# Patient Record
Sex: FEMALE | Race: WHITE | NOT HISPANIC OR LATINO | Employment: FULL TIME | URBAN - METROPOLITAN AREA
[De-identification: names, ages, dates, MRNs, and addresses within clinical notes are randomized per-mention and may not be internally consistent; named-entity substitution may affect disease eponyms.]

---

## 2017-11-23 ENCOUNTER — HOSPITAL ENCOUNTER (EMERGENCY)
Facility: HOSPITAL | Age: 27
Discharge: HOME | End: 2017-11-24
Attending: EMERGENCY MEDICINE

## 2017-11-23 LAB
B-HCG UR QL: NEGATIVE
CTP QC/QA: YES

## 2017-11-23 PROCEDURE — 99283 EMERGENCY DEPT VISIT LOW MDM: CPT | Mod: 25

## 2017-11-23 PROCEDURE — 81025 URINE PREGNANCY TEST: CPT | Performed by: EMERGENCY MEDICINE

## 2017-11-23 PROCEDURE — 25000003 PHARM REV CODE 250: Performed by: EMERGENCY MEDICINE

## 2017-11-23 RX ORDER — DICYCLOMINE HYDROCHLORIDE 10 MG/1
10 CAPSULE ORAL
Status: COMPLETED | OUTPATIENT
Start: 2017-11-23 | End: 2017-11-23

## 2017-11-23 RX ORDER — PROMETHAZINE HYDROCHLORIDE 25 MG/1
TABLET ORAL
Status: DISCONTINUED
Start: 2017-11-23 | End: 2017-11-24 | Stop reason: HOSPADM

## 2017-11-23 RX ORDER — DICYCLOMINE HYDROCHLORIDE 10 MG/1
CAPSULE ORAL
Status: DISCONTINUED
Start: 2017-11-23 | End: 2017-11-24 | Stop reason: HOSPADM

## 2017-11-23 RX ORDER — PROMETHAZINE HYDROCHLORIDE 25 MG/1
50 TABLET ORAL
Status: COMPLETED | OUTPATIENT
Start: 2017-11-23 | End: 2017-11-23

## 2017-11-23 RX ADMIN — PROMETHAZINE HYDROCHLORIDE 50 MG: 25 TABLET ORAL at 10:11

## 2017-11-23 RX ADMIN — DICYCLOMINE HYDROCHLORIDE 10 MG: 10 CAPSULE ORAL at 10:11

## 2017-11-24 VITALS
RESPIRATION RATE: 16 BRPM | HEIGHT: 67 IN | BODY MASS INDEX: 22.44 KG/M2 | TEMPERATURE: 98 F | OXYGEN SATURATION: 99 % | HEART RATE: 94 BPM | DIASTOLIC BLOOD PRESSURE: 58 MMHG | SYSTOLIC BLOOD PRESSURE: 114 MMHG | WEIGHT: 143 LBS

## 2017-11-24 RX ORDER — PROMETHAZINE HYDROCHLORIDE 25 MG/1
25 TABLET ORAL EVERY 6 HOURS PRN
Qty: 15 TABLET | Refills: 0 | Status: SHIPPED | OUTPATIENT
Start: 2017-11-24 | End: 2020-07-28 | Stop reason: SDUPTHER

## 2017-11-24 NOTE — ED PROVIDER NOTES
Encounter Date: 11/23/2017    SCRIBE #1 NOTE: I, Kristina Frank , am scribing for, and in the presence of,  Dr. Stephens . I have scribed the entire note.       History     Chief Complaint   Patient presents with    Emesis     multiple episodes of vomiting in past hour; no abdominal pain     11/23/2017  10:39 PM     Chief Complaint: Emesis       The patient is a 27 y.o. female who is presenting with the acute onset of numerous episodes of non bloody emesis that began earlier this evening approximately x 1 hour s/p eating an undercooked ham. The pt denies any exacerbating or mitigating factors. Associated sx includes nausea and epigastric abdominal tenderness. No other comments or complaints. The pt endorses x 2 known sick contacts with similar onset of sx. No pertinent PMHx or past surgical hx.               The history is provided by the patient and medical records.     Review of patient's allergies indicates:  No Known Allergies  History reviewed. No pertinent past medical history.  Past Surgical History:   Procedure Laterality Date    adnoids      TYMPANOSTOMY TUBE PLACEMENT       History reviewed. No pertinent family history.  Social History   Substance Use Topics    Smoking status: Current Every Day Smoker     Packs/day: 1.00     Years: 10.00     Types: Cigarettes    Smokeless tobacco: Never Used    Alcohol use No     Review of Systems   Constitutional: Negative for fever.   HENT: Negative for congestion.    Eyes: Negative for visual disturbance.   Respiratory: Negative for wheezing.    Cardiovascular: Negative for chest pain.   Gastrointestinal: Positive for abdominal pain, nausea and vomiting.   Genitourinary: Negative for dysuria.   Musculoskeletal: Negative for joint swelling.   Skin: Negative for rash.   Neurological: Negative for syncope.   Hematological: Does not bruise/bleed easily.   Psychiatric/Behavioral: Negative for confusion.       Physical Exam     Initial Vitals [11/23/17 2220]   BP Pulse  Resp Temp SpO2   132/60 102 16 97.6 °F (36.4 °C) 100 %      MAP       84         Physical Exam    Nursing note and vitals reviewed.  Constitutional: She appears well-developed and well-nourished. She is not diaphoretic. No distress.   HENT:   Head: Normocephalic and atraumatic.   Mouth/Throat: Oropharynx is clear and moist.   Eyes: Conjunctivae and EOM are normal. Pupils are equal, round, and reactive to light.   Neck: Normal range of motion. Neck supple. No thyroid mass present.   Cardiovascular: Normal rate and regular rhythm.   Abdominal: Soft. Normal appearance and bowel sounds are normal. There is no tenderness.   Neurological: She is alert and oriented to person, place, and time. She has normal strength. No cranial nerve deficit or sensory deficit.   Skin: Skin is warm and dry. Capillary refill takes less than 2 seconds. No rash noted. No erythema.   Psychiatric: She has a normal mood and affect. Her speech is normal. Cognition and memory are normal.         ED Course   Procedures  Labs Reviewed   POCT URINE PREGNANCY             Medical Decision Making:   Initial Assessment:   Patient was interviewed and examined in the presence of her significant other.  At this time she is noted to be in no acute distress and with VSS.  She has a nonfocal and nonsurgical abdominal examination.  She has multiple sick contacts concerning for progressing food poisoning associated with a undercooked ham tonight.  Currently she is denying wanting to receive labs or imaging and I do not think this is warranted.  She denies wanting to receive IV hydration but is requesting by mouth medication for control of bowel spasming and emesis.  She was provided single dosing oral promethazine and Bentyl.  ED Management:  On reassessment, the patient was seen sleeping and only had 1 episode of brief emesis the emergency department.  Currently I do think she stable for discharge and she is in agreement.  She was educated about supportive care  at home and will be asked to stay hydrated.  She was discharged with a prescription for oral antiemetic.  She is asked to follow-up with her primary care doctor as soon as possible regarding improvement or to return to the ER for any new, concerning, or worsening symptoms.   agreeable with this plan for follow-up and she was discharged in stable condition.            Scribe Attestation:   Scribe #1: I performed the above scribed service and the documentation accurately describes the services I performed. I attest to the accuracy of the note.    I, Dr. Clinton Stephens, personally performed the services described in this documentation. All medical record entries made by the scribe were at my direction and in my presence.  I have reviewed the chart and agree that the record reflects my personal performance and is accurate and complete. Clinton Stephens MD.  10:58 PM 11/23/2017          ED Course      Clinical Impression:   The encounter diagnosis was Food poisoning, accidental or unintentional, initial encounter.    Disposition:   Disposition: Discharged  Condition: Stable                        Clinton Stephens MD  11/24/17 0303

## 2017-11-24 NOTE — ED NOTES
Patient identifiers for Liliana Tam checked and correct.  LOC:  Patient is awake, alert, and aware of environment with an appropriate affect. Patient is oriented x 3 and speaking appropriately.  APPEARANCE:  Patient resting comfortably and in no acute distress. Patient is clean and well groomed, patient's clothing is properly fastened.  SKIN:  The skin is warm and dry. Patient has normal skin turgor and moist mucus membranes. Skin is intact; no bruising or breakdown noted.  MUSCULOSKELETAL:  Patient is moving all extremities well, no obvious deformities noted. Pulses intact.   RESPIRATORY:  Airway is open and patent. Respirations are spontaneous and non-labored with normal effort and rate.  CARDIAC:  Patient has a normal rate and rhythm. No peripheral edema noted. Capillary refill < 3 seconds.  ABDOMEN:  No distention noted.  Soft and non-tender upon palpation.Pt reports vomiting x 20 after eating ham today.   NEUROLOGICAL:  PERRL. Facial expression is symmetrical. Hand grasps are equal bilaterally. Normal sensation in all extremities when touched with finger.  Allergies reported:  Review of patient's allergies indicates:  No Known Allergies  OTHER NOTES:

## 2020-07-28 ENCOUNTER — HOSPITAL ENCOUNTER (EMERGENCY)
Facility: HOSPITAL | Age: 30
Discharge: HOME | End: 2020-07-28
Attending: EMERGENCY MEDICINE
Payer: COMMERCIAL

## 2020-07-28 VITALS
HEART RATE: 77 BPM | DIASTOLIC BLOOD PRESSURE: 67 MMHG | OXYGEN SATURATION: 100 % | RESPIRATION RATE: 20 BRPM | SYSTOLIC BLOOD PRESSURE: 109 MMHG | TEMPERATURE: 98 F

## 2020-07-28 DIAGNOSIS — R19.7 ACUTE DIARRHEA: Primary | ICD-10-CM

## 2020-07-28 DIAGNOSIS — K52.9 ACUTE COLITIS: ICD-10-CM

## 2020-07-28 LAB
ALBUMIN SERPL BCP-MCNC: 4.2 G/DL (ref 3.5–5.2)
ALP SERPL-CCNC: 71 U/L (ref 55–135)
ALT SERPL W/O P-5'-P-CCNC: 13 U/L (ref 10–44)
ANION GAP SERPL CALC-SCNC: 11 MMOL/L (ref 8–16)
AST SERPL-CCNC: 16 U/L (ref 10–40)
B-HCG UR QL: NEGATIVE
BACTERIA #/AREA URNS HPF: ABNORMAL /HPF
BASOPHILS # BLD AUTO: 0.01 K/UL (ref 0–0.2)
BASOPHILS NFR BLD: 0.2 % (ref 0–1.9)
BILIRUB SERPL-MCNC: 0.3 MG/DL (ref 0.1–1)
BILIRUB UR QL STRIP: NEGATIVE
BUN SERPL-MCNC: 6 MG/DL (ref 6–20)
CALCIUM SERPL-MCNC: 9.3 MG/DL (ref 8.7–10.5)
CHLORIDE SERPL-SCNC: 108 MMOL/L (ref 95–110)
CLARITY UR: CLEAR
CO2 SERPL-SCNC: 23 MMOL/L (ref 23–29)
COLOR UR: YELLOW
CREAT SERPL-MCNC: 0.7 MG/DL (ref 0.5–1.4)
CTP QC/QA: YES
DIFFERENTIAL METHOD: ABNORMAL
EOSINOPHIL # BLD AUTO: 0 K/UL (ref 0–0.5)
EOSINOPHIL NFR BLD: 0.4 % (ref 0–8)
ERYTHROCYTE [DISTWIDTH] IN BLOOD BY AUTOMATED COUNT: 12.1 % (ref 11.5–14.5)
EST. GFR  (AFRICAN AMERICAN): >60 ML/MIN/1.73 M^2
EST. GFR  (NON AFRICAN AMERICAN): >60 ML/MIN/1.73 M^2
GLUCOSE SERPL-MCNC: 87 MG/DL (ref 70–110)
GLUCOSE UR QL STRIP: NEGATIVE
HCT VFR BLD AUTO: 40.8 % (ref 37–48.5)
HGB BLD-MCNC: 13.1 G/DL (ref 12–16)
HGB UR QL STRIP: NEGATIVE
IMM GRANULOCYTES # BLD AUTO: 0.01 K/UL (ref 0–0.04)
IMM GRANULOCYTES NFR BLD AUTO: 0.2 % (ref 0–0.5)
KETONES UR QL STRIP: NEGATIVE
LEUKOCYTE ESTERASE UR QL STRIP: ABNORMAL
LIPASE SERPL-CCNC: 13 U/L (ref 4–60)
LYMPHOCYTES # BLD AUTO: 1 K/UL (ref 1–4.8)
LYMPHOCYTES NFR BLD: 17.6 % (ref 18–48)
MCH RBC QN AUTO: 31.1 PG (ref 27–31)
MCHC RBC AUTO-ENTMCNC: 32.1 G/DL (ref 32–36)
MCV RBC AUTO: 97 FL (ref 82–98)
MICROSCOPIC COMMENT: ABNORMAL
MONOCYTES # BLD AUTO: 0.3 K/UL (ref 0.3–1)
MONOCYTES NFR BLD: 5.5 % (ref 4–15)
NEUTROPHILS # BLD AUTO: 4.1 K/UL (ref 1.8–7.7)
NEUTROPHILS NFR BLD: 76.1 % (ref 38–73)
NITRITE UR QL STRIP: NEGATIVE
NRBC BLD-RTO: 0 /100 WBC
PH UR STRIP: 7 [PH] (ref 5–8)
PLATELET # BLD AUTO: 178 K/UL (ref 150–350)
PMV BLD AUTO: 9.8 FL (ref 9.2–12.9)
POTASSIUM SERPL-SCNC: 4.1 MMOL/L (ref 3.5–5.1)
PROT SERPL-MCNC: 7.3 G/DL (ref 6–8.4)
PROT UR QL STRIP: NEGATIVE
RBC # BLD AUTO: 4.21 M/UL (ref 4–5.4)
RBC #/AREA URNS HPF: 2 /HPF (ref 0–4)
SODIUM SERPL-SCNC: 142 MMOL/L (ref 136–145)
SP GR UR STRIP: 1.01 (ref 1–1.03)
SQUAMOUS #/AREA URNS HPF: 7 /HPF
URN SPEC COLLECT METH UR: ABNORMAL
UROBILINOGEN UR STRIP-ACNC: NEGATIVE EU/DL
WBC # BLD AUTO: 5.41 K/UL (ref 3.9–12.7)
WBC #/AREA URNS HPF: 13 /HPF (ref 0–5)

## 2020-07-28 PROCEDURE — 87086 URINE CULTURE/COLONY COUNT: CPT

## 2020-07-28 PROCEDURE — 36415 COLL VENOUS BLD VENIPUNCTURE: CPT

## 2020-07-28 PROCEDURE — 25500020 PHARM REV CODE 255

## 2020-07-28 PROCEDURE — 80053 COMPREHEN METABOLIC PANEL: CPT

## 2020-07-28 PROCEDURE — 85025 COMPLETE CBC W/AUTO DIFF WBC: CPT

## 2020-07-28 PROCEDURE — 25000003 PHARM REV CODE 250: Performed by: EMERGENCY MEDICINE

## 2020-07-28 PROCEDURE — 99285 EMERGENCY DEPT VISIT HI MDM: CPT | Mod: 25

## 2020-07-28 PROCEDURE — 96360 HYDRATION IV INFUSION INIT: CPT | Mod: 59

## 2020-07-28 PROCEDURE — 83690 ASSAY OF LIPASE: CPT

## 2020-07-28 PROCEDURE — 81025 URINE PREGNANCY TEST: CPT | Performed by: EMERGENCY MEDICINE

## 2020-07-28 PROCEDURE — 81000 URINALYSIS NONAUTO W/SCOPE: CPT

## 2020-07-28 PROCEDURE — U0003 INFECTIOUS AGENT DETECTION BY NUCLEIC ACID (DNA OR RNA); SEVERE ACUTE RESPIRATORY SYNDROME CORONAVIRUS 2 (SARS-COV-2) (CORONAVIRUS DISEASE [COVID-19]), AMPLIFIED PROBE TECHNIQUE, MAKING USE OF HIGH THROUGHPUT TECHNOLOGIES AS DESCRIBED BY CMS-2020-01-R: HCPCS

## 2020-07-28 RX ORDER — ONDANSETRON 4 MG/1
4 TABLET, FILM COATED ORAL EVERY 6 HOURS PRN
Qty: 12 TABLET | Refills: 0 | Status: SHIPPED | OUTPATIENT
Start: 2020-07-28 | End: 2021-10-14

## 2020-07-28 RX ORDER — PROMETHAZINE HYDROCHLORIDE 25 MG/1
25 TABLET ORAL EVERY 6 HOURS PRN
Qty: 15 TABLET | Refills: 0 | Status: SHIPPED | OUTPATIENT
Start: 2020-07-28 | End: 2021-10-14

## 2020-07-28 RX ORDER — CIPROFLOXACIN 500 MG/1
500 TABLET ORAL 2 TIMES DAILY
Qty: 20 TABLET | Refills: 0 | Status: SHIPPED | OUTPATIENT
Start: 2020-07-28 | End: 2020-08-04

## 2020-07-28 RX ORDER — METRONIDAZOLE 500 MG/1
500 TABLET ORAL 3 TIMES DAILY
Qty: 21 TABLET | Refills: 0 | Status: SHIPPED | OUTPATIENT
Start: 2020-07-28 | End: 2020-08-04

## 2020-07-28 RX ADMIN — IOHEXOL 75 ML: 350 INJECTION, SOLUTION INTRAVENOUS at 12:07

## 2020-07-28 RX ADMIN — SODIUM CHLORIDE 1000 ML: 0.9 INJECTION, SOLUTION INTRAVENOUS at 11:07

## 2020-07-28 NOTE — ED NOTES
Constitutional: She appears well-nourished.   HENT:   Head: Normocephalic and atraumatic.   Eyes: Conjunctivae and EOM are normal.   Neck: Normal range of motion. Neck supple. No thyroid mass present.   Cardiovascular: Normal rate, regular rhythm and normal heart sounds. Exam reveals no gallop and no friction rub.    No murmur heard.  Pulmonary/Chest: Breath sounds normal. She has no wheezes. She has no rhonchi. She has no rales.   Abdominal: Soft. Normal appearance and bowel sounds are normal. There is abdominal tenderness in the left lower quadrant.   Hypermobile bowel sounds.  Mild tenderness to palpation to the LLQ.   Neurological: She is alert and oriented to person, place, and time. She has normal strength. No cranial nerve deficit or sensory deficit.   Skin: Skin is warm and dry. No rash noted. No erythema.

## 2020-07-28 NOTE — ED PROVIDER NOTES
"Encounter Date: 7/28/2020    SCRIBE #1 NOTE: I, Randa Loza, am scribing for, and in the presence of, Dr. Stephens.       History     Chief Complaint   Patient presents with    Abdominal Pain     beginning yesterday    Diarrhea       Time seen by provider: 10:06 AM on 07/28/2020    Liliana Oshea is a 29 y.o. female with no PMHx who presents to the ED via EMS with an onset of intermittent abdominal pain.  Patient reports pain began 1 day ago and is felt in her upper left, lower left and lower right abdominal region.  She notes going to the bathroom and producing "mucous-like" stools.  Patient reports having 10 bowel movements in 1 day.  She states that she has a family Hx of gastric issues.  She confirms feeling light-headed, dizzy, having bloody stools, low grade fever, diarrhea, nausea, a Hx of asthma and bronchitis and recently swimming in a "dirty" lake.   She also confirms feeling swollen and bloated in her abdominal area.  The patient denies vomiting, eating anything red, changes to her appetitie or any other symptoms at this time.  She also denies a Hx of IBS, Chrohn's disease, hemorrhoids, ulcers nor colitis.  No pertinent PSHx.  LMP was 2 weeks ago.       The history is provided by the patient.     Review of patient's allergies indicates:  No Known Allergies  No past medical history on file.  Past Surgical History:   Procedure Laterality Date    adnoids      TYMPANOSTOMY TUBE PLACEMENT       No family history on file.  Social History     Tobacco Use    Smoking status: Current Every Day Smoker     Packs/day: 1.00     Years: 10.00     Pack years: 10.00     Types: Cigarettes    Smokeless tobacco: Never Used   Substance Use Topics    Alcohol use: No    Drug use: No     Review of Systems   Constitutional: Positive for fever. Negative for appetite change.   HENT: Negative for sore throat.    Respiratory: Negative for shortness of breath.    Cardiovascular: Negative for chest pain. "   Gastrointestinal: Positive for abdominal pain, blood in stool, diarrhea and nausea. Negative for vomiting.   Genitourinary: Negative for dysuria.   Musculoskeletal: Negative for back pain.   Skin: Negative for rash.   Neurological: Positive for dizziness and light-headedness. Negative for weakness.   Hematological: Does not bruise/bleed easily.       Physical Exam     Initial Vitals [07/28/20 0959]   BP Pulse Resp Temp SpO2   109/67 77 20 98.2 °F (36.8 °C) 100 %      MAP       --         Physical Exam    Nursing note and vitals reviewed.  Constitutional: She appears well-nourished.   HENT:   Head: Normocephalic and atraumatic.   Eyes: Conjunctivae and EOM are normal.   Neck: Normal range of motion. Neck supple. No thyroid mass present.   Cardiovascular: Normal rate, regular rhythm and normal heart sounds. Exam reveals no gallop and no friction rub.    No murmur heard.  Pulmonary/Chest: Breath sounds normal. She has no wheezes. She has no rhonchi. She has no rales.   Abdominal: Soft. Normal appearance and bowel sounds are normal. There is abdominal tenderness in the left lower quadrant.   Hypermobile bowel sounds.  Mild tenderness to palpation to the LLQ.   Neurological: She is alert and oriented to person, place, and time. She has normal strength. No cranial nerve deficit or sensory deficit.   Skin: Skin is warm and dry. No rash noted. No erythema.   Psychiatric: She has a normal mood and affect. Her speech is normal. Cognition and memory are normal.         ED Course   Procedures  Labs Reviewed   CBC W/ AUTO DIFFERENTIAL - Abnormal; Notable for the following components:       Result Value    Mean Corpuscular Hemoglobin 31.1 (*)     Gran% 76.1 (*)     Lymph% 17.6 (*)     All other components within normal limits   URINALYSIS, REFLEX TO URINE CULTURE - Abnormal; Notable for the following components:    Leukocytes, UA 1+ (*)     All other components within normal limits    Narrative:     Specimen Source->Urine    URINALYSIS MICROSCOPIC - Abnormal; Notable for the following components:    WBC, UA 13 (*)     Bacteria Few (*)     All other components within normal limits    Narrative:     Specimen Source->Urine   CULTURE, URINE   COMPREHENSIVE METABOLIC PANEL   LIPASE   SARS-COV-2 (COVID-19) QUALITATIVE PCR   POCT URINE PREGNANCY          Imaging Results          CT Abdomen Pelvis With Contrast (Final result)  Result time 07/28/20 12:38:14    Final result by Chelsea Mahmood MD (07/28/20 12:38:14)                 Impression:      Distal colonic wall thickening/inflammation is questioned but difficult to confirm without the use oral contrast. This could be due to diverticulitis, infectious colitis, inflammatory bowel disease.    Physiologic follicular cyst right ovary.  Nonobstructing right lower pole renal calculus.  Probable passive congestion of the liver from aggressive hydration, see comments above.      Electronically signed by: Chelsea Mahmood  Date:    07/28/2020  Time:    12:38             Narrative:    EXAMINATION:  CT ABDOMEN PELVIS WITH CONTRAST    CLINICAL HISTORY:  LLQ abdominal pain, diverticulitis suspected;    TECHNIQUE:  Low dose axial images, sagittal and coronal reformations were obtained from the lung bases to the pubic symphysis following the IV administration of 75 mL of Omnipaque 350 .  No oral contrast was administered    COMPARISON:  None    FINDINGS:  Visualized portions of the lung bases are clear.    The liver is prominent size, with dilatation of the intrahepatic IVC and hepatic veins, finding which can be seen with aggressive IV hydration or with right heart failure.    The spleen, adrenal glands, gallbladder, pancreas and left kidney are normal.  There is a nonobstructing 4 mm right lower pole renal calculus.    The aorta is normal in caliber.  No calcific plaque is present.    The urinary bladder is collapsed.  The uterus is present.  There are multiple follicles in the ovaries along  with a 19 mm cyst in the right ovary.  There is a trace quantity of pelvic free fluid.    No dilated loops of bowel are present.  A normal appendix is present.  There is questionable wall thickening throughout the rectosigmoid colon, difficult to evaluate without the use of oral contrast.  There is slight haziness to the fat of the pelvis.  There is no abscess perforation.                                 Medical Decision Making:   History:   Old Medical Records: I decided to obtain old medical records.  Clinical Tests:   Lab Tests: Ordered and Reviewed  Radiological Study: Ordered and Reviewed  ED Management:  This patient was interviewed assessed emergently.  Vital signs are stable.  There is no guarding or additional indicators of a surgical abdomen on initial evaluation.  IV was established and she had improvement here with symptomatic medication.  No pelvic complaints or adnexal pain.  Screening labs are found to be largely unremarkable but a CT scan does indicate some findings consistent with colitis/diverticulitis.  Patient is educated that these findings appear consistent with ongoing symptomatology and she will be asked to initiate therapy with Flagyl and ciprofloxacin.  She is educated about ciprofloxacin and potential black box warnings including muscle aches, tendon rupture.  She is asked to rest while taking this medication and follow up with her doctor prior to read initiating normal activity.  She additionally provided information regarding a gastroenterologist and is also asked to follow up with the specialist as soon as possible.  She is asked to return to the ER immediately for any new, concerning, or worsening symptoms.  Patient was agreeable with this plan for follow-up and was discharged in stable condition.            Scribe Attestation:   Scribe #1: I performed the above scribed service and the documentation accurately describes the services I performed. I attest to the accuracy of the  note.    I, Dr. Clinton Stephens, personally performed the services described in this documentation. All medical record entries made by the scribe were at my direction and in my presence.  I have reviewed the chart and agree that the record reflects my personal performance and is accurate and complete. Clinton Stephens MD.  9:13 PM 07/29/2020                        Clinical Impression:       ICD-10-CM ICD-9-CM   1. Acute diarrhea  R19.7 787.91   2. Acute colitis  K52.9 558.9         Disposition:   Disposition: Discharged  Condition: Stable     ED Disposition Condition    Discharge Stable        ED Prescriptions     Medication Sig Dispense Start Date End Date Auth. Provider    metroNIDAZOLE (FLAGYL) 500 MG tablet Take 1 tablet (500 mg total) by mouth 3 (three) times daily. for 7 days 21 tablet 7/28/2020 8/4/2020 Clinton Stephens MD    ciprofloxacin HCl (CIPRO) 500 MG tablet Take 1 tablet (500 mg total) by mouth 2 (two) times daily. for 7 days 20 tablet 7/28/2020 8/4/2020 Clinton Stephens MD    ondansetron (ZOFRAN) 4 MG tablet Take 1 tablet (4 mg total) by mouth every 6 (six) hours as needed for Nausea. 12 tablet 7/28/2020  Clinton Stephens MD    promethazine (PHENERGAN) 25 MG tablet Take 1 tablet (25 mg total) by mouth every 6 (six) hours as needed for Nausea. 15 tablet 7/28/2020  Clinton Stephens MD        Follow-up Information     Follow up With Specialties Details Why Contact Info    John Paul Guerrier MD Internal Medicine Schedule an appointment as soon as possible for a visit   [address] [place] [address] [address]  [place] [address]  [phone]      Fermín Louise MD Gastroenterology Schedule an appointment as soon as possible for a visit   [address] [facility]  [address]  [place] [address]  [phone]                                       Clinton Stephens MD  07/29/20 8548

## 2020-07-28 NOTE — ED NOTES
Pt denies any needs. VSS. ED workup in progress. Pt updated and verbalizes understanding. No other needs identified. Will monitor prn.

## 2020-07-29 LAB
BACTERIA UR CULT: NO GROWTH
SARS-COV-2 RNA RESP QL NAA+PROBE: NOT DETECTED

## 2020-12-31 ENCOUNTER — OFFICE VISIT (OUTPATIENT)
Dept: URGENT CARE | Facility: CLINIC | Age: 30
End: 2020-12-31
Payer: COMMERCIAL

## 2020-12-31 VITALS
OXYGEN SATURATION: 98 % | SYSTOLIC BLOOD PRESSURE: 105 MMHG | DIASTOLIC BLOOD PRESSURE: 68 MMHG | RESPIRATION RATE: 16 BRPM | HEART RATE: 74 BPM | TEMPERATURE: 98 F

## 2020-12-31 DIAGNOSIS — Z20.822 COVID-19 VIRUS NOT DETECTED: Primary | ICD-10-CM

## 2020-12-31 DIAGNOSIS — Z20.822 EXPOSURE TO COVID-19 VIRUS: ICD-10-CM

## 2020-12-31 LAB
CTP QC/QA: YES
SARS-COV-2 RDRP RESP QL NAA+PROBE: NEGATIVE

## 2020-12-31 PROCEDURE — 99203 OFFICE O/P NEW LOW 30 MIN: CPT | Mod: S$GLB,,, | Performed by: EMERGENCY MEDICINE

## 2020-12-31 PROCEDURE — 99203 PR OFFICE/OUTPT VISIT, NEW, LEVL III, 30-44 MIN: ICD-10-PCS | Mod: S$GLB,,, | Performed by: EMERGENCY MEDICINE

## 2020-12-31 PROCEDURE — U0002 COVID-19 LAB TEST NON-CDC: HCPCS | Mod: QW,S$GLB,, | Performed by: EMERGENCY MEDICINE

## 2020-12-31 PROCEDURE — U0002: ICD-10-PCS | Mod: QW,S$GLB,, | Performed by: EMERGENCY MEDICINE

## 2020-12-31 NOTE — PROGRESS NOTES
Subjective:       Patient ID: Liliana Oshea is a 30 y.o. female.    Vitals:  temperature is 97.8 °F (36.6 °C). Her blood pressure is 105/68 and her pulse is 74. Her respiration is 16 and oxygen saturation is 98%.     Chief Complaint: No chief complaint on file.    Patient reports she was exposed to Covid on Christmas. Patient reports she is not having any symptoms, but wants to be tested because she is going to a party tonight for New Year's Alyse.       Constitution: Negative for chills, fatigue and fever.   HENT: Negative for congestion and sore throat.    Neck: Negative for painful lymph nodes.   Cardiovascular: Negative for chest pain and leg swelling.   Eyes: Negative for double vision and blurred vision.   Respiratory: Negative for cough and shortness of breath.    Gastrointestinal: Negative for nausea, vomiting and diarrhea.   Genitourinary: Negative for dysuria, frequency, urgency and history of kidney stones.   Musculoskeletal: Negative for joint pain, joint swelling, muscle cramps and muscle ache.   Skin: Negative for color change, pale, rash and bruising.   Allergic/Immunologic: Negative for seasonal allergies.   Neurological: Negative for dizziness, history of vertigo, light-headedness, passing out and headaches.   Hematologic/Lymphatic: Negative for swollen lymph nodes.   Psychiatric/Behavioral: Negative for nervous/anxious, sleep disturbance and depression. The patient is not nervous/anxious.        Objective:      Physical Exam   Constitutional: She is oriented to person, place, and time. She is cooperative.  Non-toxic appearance. She does not appear ill. No distress.   Pulmonary/Chest: Effort normal.   Abdominal: Normal appearance.   Neurological: She is alert and oriented to person, place, and time. GCS eye subscore is 4. GCS verbal subscore is 5. GCS motor subscore is 6.   Skin: Skin is not diaphoretic.   Vitals reviewed.        Assessment:       1. COVID-19 virus not detected    2. Exposure  to COVID-19 virus        Plan:       Rapid COVID test is negative. Patient notified in clinic.    COVID-19 virus not detected    Exposure to COVID-19 virus  -     POCT COVID-19 Rapid Screening

## 2021-04-29 ENCOUNTER — PATIENT MESSAGE (OUTPATIENT)
Dept: RESEARCH | Facility: HOSPITAL | Age: 31
End: 2021-04-29

## 2021-10-14 ENCOUNTER — OFFICE VISIT (OUTPATIENT)
Dept: FAMILY MEDICINE | Facility: CLINIC | Age: 31
End: 2021-10-14
Payer: COMMERCIAL

## 2021-10-14 VITALS
SYSTOLIC BLOOD PRESSURE: 90 MMHG | BODY MASS INDEX: 22.4 KG/M2 | HEART RATE: 90 BPM | OXYGEN SATURATION: 98 % | DIASTOLIC BLOOD PRESSURE: 68 MMHG | HEIGHT: 67 IN | TEMPERATURE: 98 F

## 2021-10-14 DIAGNOSIS — R05.3 CHRONIC COUGH: Primary | ICD-10-CM

## 2021-10-14 DIAGNOSIS — R53.83 FATIGUE, UNSPECIFIED TYPE: ICD-10-CM

## 2021-10-14 DIAGNOSIS — Z00.01 ENCOUNTER FOR PREVENTATIVE ADULT HEALTH CARE EXAM WITH ABNORMAL FINDINGS: ICD-10-CM

## 2021-10-14 DIAGNOSIS — R61 NIGHT SWEATS: ICD-10-CM

## 2021-10-14 PROCEDURE — 1160F RVW MEDS BY RX/DR IN RCRD: CPT | Mod: S$GLB,,, | Performed by: INTERNAL MEDICINE

## 2021-10-14 PROCEDURE — 3008F BODY MASS INDEX DOCD: CPT | Mod: S$GLB,,, | Performed by: INTERNAL MEDICINE

## 2021-10-14 PROCEDURE — 99203 OFFICE O/P NEW LOW 30 MIN: CPT | Mod: S$GLB,,, | Performed by: INTERNAL MEDICINE

## 2021-10-14 PROCEDURE — 1160F PR REVIEW ALL MEDS BY PRESCRIBER/CLIN PHARMACIST DOCUMENTED: ICD-10-PCS | Mod: S$GLB,,, | Performed by: INTERNAL MEDICINE

## 2021-10-14 PROCEDURE — 3008F PR BODY MASS INDEX (BMI) DOCUMENTED: ICD-10-PCS | Mod: S$GLB,,, | Performed by: INTERNAL MEDICINE

## 2021-10-14 PROCEDURE — 99203 PR OFFICE/OUTPT VISIT, NEW, LEVL III, 30-44 MIN: ICD-10-PCS | Mod: S$GLB,,, | Performed by: INTERNAL MEDICINE

## 2021-10-14 RX ORDER — MONTELUKAST SODIUM 10 MG/1
10 TABLET ORAL NIGHTLY
Qty: 30 TABLET | Refills: 3 | Status: SHIPPED | OUTPATIENT
Start: 2021-10-14 | End: 2021-11-13

## 2021-10-14 RX ORDER — CETIRIZINE HYDROCHLORIDE 10 MG/1
10 TABLET ORAL DAILY
COMMUNITY

## 2023-01-24 ENCOUNTER — HOSPITAL ENCOUNTER (EMERGENCY)
Facility: HOSPITAL | Age: 33
Discharge: LEFT BEFORE DISCHARGE | End: 2023-01-24
Attending: EMERGENCY MEDICINE
Payer: COMMERCIAL

## 2023-01-24 VITALS
BODY MASS INDEX: 21.98 KG/M2 | HEART RATE: 71 BPM | SYSTOLIC BLOOD PRESSURE: 130 MMHG | HEIGHT: 68 IN | OXYGEN SATURATION: 99 % | TEMPERATURE: 98 F | DIASTOLIC BLOOD PRESSURE: 84 MMHG | WEIGHT: 145 LBS | RESPIRATION RATE: 18 BRPM

## 2023-01-24 DIAGNOSIS — F17.200 TOBACCO DEPENDENCE: ICD-10-CM

## 2023-01-24 DIAGNOSIS — R05.9 COUGH: ICD-10-CM

## 2023-01-24 DIAGNOSIS — R05.3 CHRONIC COUGH: Primary | ICD-10-CM

## 2023-01-24 LAB
ALBUMIN SERPL BCP-MCNC: 4.7 G/DL (ref 3.5–5.2)
ALP SERPL-CCNC: 53 U/L (ref 55–135)
ALT SERPL W/O P-5'-P-CCNC: 19 U/L (ref 10–44)
ANION GAP SERPL CALC-SCNC: 11 MMOL/L (ref 8–16)
AST SERPL-CCNC: 20 U/L (ref 10–40)
B-HCG UR QL: NEGATIVE
BASOPHILS # BLD AUTO: 0.02 K/UL (ref 0–0.2)
BASOPHILS NFR BLD: 0.4 % (ref 0–1.9)
BILIRUB SERPL-MCNC: 0.9 MG/DL (ref 0.1–1)
BUN SERPL-MCNC: 10 MG/DL (ref 6–20)
CALCIUM SERPL-MCNC: 9.5 MG/DL (ref 8.7–10.5)
CHLORIDE SERPL-SCNC: 104 MMOL/L (ref 95–110)
CO2 SERPL-SCNC: 22 MMOL/L (ref 23–29)
CREAT SERPL-MCNC: 0.6 MG/DL (ref 0.5–1.4)
CTP QC/QA: YES
D DIMER PPP IA.FEU-MCNC: 0.47 MG/L FEU
DIFFERENTIAL METHOD: ABNORMAL
EOSINOPHIL # BLD AUTO: 0 K/UL (ref 0–0.5)
EOSINOPHIL NFR BLD: 0.2 % (ref 0–8)
ERYTHROCYTE [DISTWIDTH] IN BLOOD BY AUTOMATED COUNT: 12.3 % (ref 11.5–14.5)
EST. GFR  (NO RACE VARIABLE): >60 ML/MIN/1.73 M^2
GLUCOSE SERPL-MCNC: 83 MG/DL (ref 70–110)
HCT VFR BLD AUTO: 41 % (ref 37–48.5)
HGB BLD-MCNC: 13.9 G/DL (ref 12–16)
IMM GRANULOCYTES # BLD AUTO: 0.02 K/UL (ref 0–0.04)
IMM GRANULOCYTES NFR BLD AUTO: 0.4 % (ref 0–0.5)
INFLUENZA A, MOLECULAR: NEGATIVE
INFLUENZA B, MOLECULAR: NEGATIVE
LYMPHOCYTES # BLD AUTO: 1.8 K/UL (ref 1–4.8)
LYMPHOCYTES NFR BLD: 32.6 % (ref 18–48)
MAGNESIUM SERPL-MCNC: 2 MG/DL (ref 1.6–2.6)
MCH RBC QN AUTO: 32.3 PG (ref 27–31)
MCHC RBC AUTO-ENTMCNC: 33.9 G/DL (ref 32–36)
MCV RBC AUTO: 95 FL (ref 82–98)
MONOCYTES # BLD AUTO: 0.3 K/UL (ref 0.3–1)
MONOCYTES NFR BLD: 6.1 % (ref 4–15)
NEUTROPHILS # BLD AUTO: 3.4 K/UL (ref 1.8–7.7)
NEUTROPHILS NFR BLD: 60.3 % (ref 38–73)
NRBC BLD-RTO: 0 /100 WBC
PLATELET # BLD AUTO: 215 K/UL (ref 150–450)
PMV BLD AUTO: 9.4 FL (ref 9.2–12.9)
POTASSIUM SERPL-SCNC: 3.6 MMOL/L (ref 3.5–5.1)
PROT SERPL-MCNC: 8.1 G/DL (ref 6–8.4)
RBC # BLD AUTO: 4.3 M/UL (ref 4–5.4)
SARS-COV-2 RDRP RESP QL NAA+PROBE: NEGATIVE
SODIUM SERPL-SCNC: 137 MMOL/L (ref 136–145)
SPECIMEN SOURCE: NORMAL
WBC # BLD AUTO: 5.59 K/UL (ref 3.9–12.7)

## 2023-01-24 PROCEDURE — 86480 TB TEST CELL IMMUN MEASURE: CPT | Performed by: NURSE PRACTITIONER

## 2023-01-24 PROCEDURE — 85379 FIBRIN DEGRADATION QUANT: CPT | Performed by: NURSE PRACTITIONER

## 2023-01-24 PROCEDURE — 80053 COMPREHEN METABOLIC PANEL: CPT | Performed by: NURSE PRACTITIONER

## 2023-01-24 PROCEDURE — 87502 INFLUENZA DNA AMP PROBE: CPT | Performed by: NURSE PRACTITIONER

## 2023-01-24 PROCEDURE — 99284 EMERGENCY DEPT VISIT MOD MDM: CPT | Mod: 25

## 2023-01-24 PROCEDURE — 83735 ASSAY OF MAGNESIUM: CPT | Performed by: NURSE PRACTITIONER

## 2023-01-24 PROCEDURE — 81025 URINE PREGNANCY TEST: CPT | Performed by: NURSE PRACTITIONER

## 2023-01-24 PROCEDURE — U0002 COVID-19 LAB TEST NON-CDC: HCPCS | Performed by: NURSE PRACTITIONER

## 2023-01-24 PROCEDURE — 85025 COMPLETE CBC W/AUTO DIFF WBC: CPT | Performed by: NURSE PRACTITIONER

## 2023-01-24 RX ORDER — ALBUTEROL SULFATE 90 UG/1
2 AEROSOL, METERED RESPIRATORY (INHALATION) EVERY 6 HOURS PRN
Qty: 18 G | Refills: 0 | Status: SHIPPED | OUTPATIENT
Start: 2023-01-24 | End: 2024-01-24

## 2023-01-24 RX ORDER — IPRATROPIUM BROMIDE AND ALBUTEROL SULFATE 2.5; .5 MG/3ML; MG/3ML
3 SOLUTION RESPIRATORY (INHALATION)
Status: DISCONTINUED | OUTPATIENT
Start: 2023-01-24 | End: 2023-01-24 | Stop reason: HOSPADM

## 2023-01-24 NOTE — ED PROVIDER NOTES
"Encounter Date: 1/24/2023       History     Chief Complaint   Patient presents with    Cough     Reports cough "for a long time" - SOB started this AM      Liliana Oshea is a 32 year old female with pmh chronic bronchitis presenting to the ED with c/o cough. She states that she currently smokes at least a pack/day and has had the cough "for a long time". She states she has tried allergy medications in the past without relief. She states that she has had intermittent hand/feet tingling and sees blue spots sometimes after coughing. She has had no chest pain or exertional SOB. She denies hemoptysis or recent weight loss. She states sometimes she does have sweating on her chest at night and may have been exposed to TB in the past by her fiance.     Review of patient's allergies indicates:  No Known Allergies  Past Medical History:   Diagnosis Date    Chronic bronchitis      Past Surgical History:   Procedure Laterality Date    ADENOIDECTOMY W/ MYRINGOTOMY AND TUBES      AUGMENTATION OF BREAST  07/2018     Family History   Problem Relation Age of Onset    Mental illness Mother     Rashes / Skin problems Mother     No Known Problems Father     Breast cancer Maternal Grandmother     Prostate cancer Maternal Grandfather      Social History     Tobacco Use    Smoking status: Every Day     Packs/day: 1.00     Years: 10.00     Pack years: 10.00     Types: Cigarettes    Smokeless tobacco: Never   Substance Use Topics    Alcohol use: Yes     Alcohol/week: 4.0 standard drinks     Types: 4 Shots of liquor per week    Drug use: Yes     Frequency: 7.0 times per week     Types: Marijuana     Review of Systems   Constitutional:  Negative for fever.   HENT:  Negative for congestion and sore throat.    Respiratory:  Positive for cough. Negative for shortness of breath.    Cardiovascular:  Negative for chest pain.   Gastrointestinal:  Negative for diarrhea, nausea and vomiting.   Genitourinary:  Negative for dysuria. "   Musculoskeletal:  Negative for back pain.   Skin:  Negative for rash.   Neurological:  Positive for numbness. Negative for weakness.   Hematological:  Does not bruise/bleed easily.     Physical Exam     Initial Vitals [01/24/23 1037]   BP Pulse Resp Temp SpO2   130/84 71 18 97.6 °F (36.4 °C) 99 %      MAP       --         Physical Exam    Nursing note and vitals reviewed.  Constitutional: She appears well-developed and well-nourished. She is not diaphoretic. No distress.   HENT:   Head: Normocephalic and atraumatic.   Mouth/Throat: Oropharynx is clear and moist.   Eyes: Conjunctivae are normal.   Neck: Neck supple.   Cardiovascular:  Normal rate, regular rhythm, normal heart sounds and intact distal pulses.     Exam reveals no gallop and no friction rub.       No murmur heard.  Pulses:       Radial pulses are 2+ on the right side and 2+ on the left side.        Dorsalis pedis pulses are 2+ on the right side and 2+ on the left side.   Pulmonary/Chest: Breath sounds normal. No respiratory distress. She has no wheezes. She has no rhonchi. She has no rales.   Abdominal: Abdomen is soft. She exhibits no distension. There is no abdominal tenderness.   Musculoskeletal:         General: Normal range of motion.      Cervical back: Neck supple.     Neurological: She is alert and oriented to person, place, and time.   Skin: No rash noted. No erythema.   Psychiatric: Her speech is normal.       ED Course   Procedures  Labs Reviewed   SARS-COV-2 RNA AMPLIFICATION, QUAL   INFLUENZA A AND B ANTIGEN    Narrative:     Specimen Source->Nasopharyngeal Swab   QUANTIFERON-TB GOLD PLUS   POCT URINE PREGNANCY          Imaging Results              X-Ray Chest PA And Lateral (In process)    Procedure changed from X-Ray Chest AP Portable                    Medications   albuterol-ipratropium 2.5 mg-0.5 mg/3 mL nebulizer solution 3 mL (has no administration in time range)           APC / Resident Notes:   The patient c/o cough that has been  present for greater than one year with intermittent tingling in the bilateral hands/feet. She has no adventitious lung sounds and has an xray that shows no acute abnormalities. No evidence of pneumothorax, infiltrate, pulmonary edema. She reports tingling in the hands/feet but is neurovascularly intact. Due to possible TB exposure, will send quantiferon gold and refer patient to pulmonology.   I advised patient that they need additional workup in the ER and she refuses to stay for results at this time. The patient is currently alert and oriented to person, place, and situation.  I explained the risks of worsening condition, death etc in layman's terms to the patient and they were able to verbalize these risks back to me.   The patient is not altered and is not under the influence.  I advised them that they are welcome to return to the hospital at any time if he chooses to do so.  I will discharge the patient AGAINST MEDICAL ADVICE.\    I did review patient's labs and she has a negative d-dimer of 0.47 and no electrolyte abnormalities. The patient was instructed on the importance of tobacco cessation and close follow up with pulmonology and PCP.                      Clinical Impression:   Final diagnoses:  [R05.9] Cough  [R05.3] Chronic cough (Primary)  [F17.200] Tobacco dependence               Shakira Tompkins NP  01/24/23 3747

## 2023-01-27 LAB
GAMMA INTERFERON BACKGROUND BLD IA-ACNC: 0.12 IU/ML
M TB IFN-G BLD-IMP: NEGATIVE
M TB IFN-G CD4+ BCKGRND COR BLD-ACNC: 0.16 IU/ML
M TB IFN-G CD4+CD8+ BCKGRND COR BLD-ACNC: 0.18 IU/ML
MITOGEN IGNF BLD-ACNC: >10 IU/ML
QUANTIFERON CRITERIA: NORMAL

## 2024-06-10 ENCOUNTER — HOSPITAL ENCOUNTER (OUTPATIENT)
Dept: RADIOLOGY | Facility: HOSPITAL | Age: 34
Discharge: HOME | End: 2024-06-10
Attending: INTERNAL MEDICINE
Payer: COMMERCIAL

## 2024-06-10 ENCOUNTER — OFFICE VISIT (OUTPATIENT)
Dept: CARDIOLOGY | Facility: CLINIC | Age: 34
End: 2024-06-10
Payer: COMMERCIAL

## 2024-06-10 VITALS
HEIGHT: 68 IN | RESPIRATION RATE: 16 BRPM | WEIGHT: 137 LBS | SYSTOLIC BLOOD PRESSURE: 108 MMHG | HEART RATE: 72 BPM | DIASTOLIC BLOOD PRESSURE: 62 MMHG | OXYGEN SATURATION: 100 % | BODY MASS INDEX: 20.76 KG/M2

## 2024-06-10 DIAGNOSIS — R06.00 DYSPNEA, UNSPECIFIED TYPE: ICD-10-CM

## 2024-06-10 DIAGNOSIS — F17.200 TOBACCO USE DISORDER, SEVERE, DEPENDENCE: ICD-10-CM

## 2024-06-10 DIAGNOSIS — R05.8 RECURRENT PRODUCTIVE COUGH: ICD-10-CM

## 2024-06-10 DIAGNOSIS — R00.2 PALPITATIONS: Primary | ICD-10-CM

## 2024-06-10 DIAGNOSIS — R00.2 PALPITATIONS: ICD-10-CM

## 2024-06-10 DIAGNOSIS — K21.9 GASTROESOPHAGEAL REFLUX DISEASE WITHOUT ESOPHAGITIS: ICD-10-CM

## 2024-06-10 PROCEDURE — 1160F RVW MEDS BY RX/DR IN RCRD: CPT | Mod: CPTII,S$GLB,, | Performed by: INTERNAL MEDICINE

## 2024-06-10 PROCEDURE — 1159F MED LIST DOCD IN RCRD: CPT | Mod: CPTII,S$GLB,, | Performed by: INTERNAL MEDICINE

## 2024-06-10 PROCEDURE — 3008F BODY MASS INDEX DOCD: CPT | Mod: CPTII,S$GLB,, | Performed by: INTERNAL MEDICINE

## 2024-06-10 PROCEDURE — 99999 PR PBB SHADOW E&M-EST. PATIENT-LVL III: CPT | Mod: PBBFAC,,, | Performed by: INTERNAL MEDICINE

## 2024-06-10 PROCEDURE — 71046 X-RAY EXAM CHEST 2 VIEWS: CPT | Mod: 26,,, | Performed by: RADIOLOGY

## 2024-06-10 PROCEDURE — 3078F DIAST BP <80 MM HG: CPT | Mod: CPTII,S$GLB,, | Performed by: INTERNAL MEDICINE

## 2024-06-10 PROCEDURE — 3074F SYST BP LT 130 MM HG: CPT | Mod: CPTII,S$GLB,, | Performed by: INTERNAL MEDICINE

## 2024-06-10 PROCEDURE — 71046 X-RAY EXAM CHEST 2 VIEWS: CPT | Mod: TC

## 2024-06-10 PROCEDURE — 99204 OFFICE O/P NEW MOD 45 MIN: CPT | Mod: S$GLB,,, | Performed by: INTERNAL MEDICINE

## 2024-06-10 RX ORDER — FLUTICASONE PROPIONATE 50 MCG
1 SPRAY, SUSPENSION (ML) NASAL DAILY
COMMUNITY

## 2024-06-10 RX ORDER — VARENICLINE TARTRATE 0.5 (11)-1
1 KIT ORAL 2 TIMES DAILY
Qty: 60 TABLET | Refills: 5 | Status: SHIPPED | OUTPATIENT
Start: 2024-06-10 | End: 2024-12-07

## 2024-06-10 RX ORDER — LANOLIN ALCOHOL/MO/W.PET/CERES
400 CREAM (GRAM) TOPICAL DAILY
Qty: 90 TABLET | Refills: 3 | Status: SHIPPED | OUTPATIENT
Start: 2024-06-10 | End: 2025-06-10

## 2024-06-10 RX ORDER — PANTOPRAZOLE SODIUM 40 MG/1
40 TABLET, DELAYED RELEASE ORAL 2 TIMES DAILY
Qty: 60 TABLET | Refills: 11 | Status: SHIPPED | OUTPATIENT
Start: 2024-06-10 | End: 2025-06-10

## 2024-06-10 RX ORDER — MONTELUKAST SODIUM 10 MG/1
10 TABLET ORAL NIGHTLY
Qty: 30 TABLET | Refills: 11 | Status: SHIPPED | OUTPATIENT
Start: 2024-06-10 | End: 2025-06-10

## 2024-06-10 RX ORDER — ALBUTEROL SULFATE 90 UG/1
2 AEROSOL, METERED RESPIRATORY (INHALATION) EVERY 6 HOURS PRN
Qty: 18 G | Refills: 0 | Status: SHIPPED | OUTPATIENT
Start: 2024-06-10 | End: 2025-06-10

## 2024-06-10 NOTE — ASSESSMENT & PLAN NOTE
She is presently on Singulair and Ventolin inhaler.  Recommend to obtain a chest x-ray to rule out any primary pulmonary pathology.    Recommend to start on doxycycline at 100 mg p.o. b.i.d. for 1 week duration.  Out recommend to avoid the sun while using the antibiotic pill a   Continue on Ventolin inhaler  Singulair at 10 mg daily as well

## 2024-06-10 NOTE — ASSESSMENT & PLAN NOTE
Dangers of cigarette smoking were reviewed with patient in detail. Patient was Counseled for 10 minutes or greater. Nicotine replacement options were discussed. Nicotine replacement was discussed- prescribed  Recommend to start on Chantix starter pack and then bump it up to 1 mg twice daily thereafter.  In addition to other medications discussed about

## 2024-06-10 NOTE — ASSESSMENT & PLAN NOTE
Multiple episodes of palpitations and bradycardia noted.  Will obtain a event recorder to assess the extent of bradycardia rhythm if any and also to determine tachycardia.  Recommend a symptom limited exercise stress test to assess for tachycardia is exercise-induced and if negative then encouraged and a daily exercise program   Recommend to obtain echocardiogram for chamber size measurements valvular morphology as well.

## 2024-06-10 NOTE — ASSESSMENT & PLAN NOTE
She was symptoms of significant gastritis esophageal reflux symptoms.  I would encourage her to start on pantoprazole at 40 mg twice a day initially for next 3 4 weeks and then daily thereafter.

## 2024-06-10 NOTE — PROGRESS NOTES
Subjective:    Patient ID:  Liliana Oshea is a 33 y.o. female patient here for evaluation Establish Care (She feels like her heart is stopping in her sleep. She does smoke daily./Family history of heart disease )      History of Present Illness:     33-year-old lady who has been having recurrent episodes of cough and congestion has been under tremendous amount of stress with work-related issues is seeking to establish cardiovascular care she feels like her heart is stopping beats at times and usually at nighttime when she was lies down.  This has more pronounced initially on the left side both right and left side or back was her to the symptoms.  She was awakened from with feeling of struggling to breathe and catch her breath.  She also has symptoms of reflux and and this has also associated with cough as well.    She was extensive tobacco user.  And started age 9 and she wants to quit tobacco she has persistent cough congestion and feeling of shortness of breath as well.  She was seen by pulmonologist has been on some inhaler therapy with no significant improvement.    She wants to quit tobacco at this point.  And she also wants to change her lifestyle as much.  No fevers or chills  She was frequent sweating as well.  She feels nervous and anxiety. During a times.  Lost about 10 lb unintentionally        Review of patient's allergies indicates:  No Known Allergies    Past Medical History:   Diagnosis Date    Chronic bronchitis      Past Surgical History:   Procedure Laterality Date    ADENOIDECTOMY W/ MYRINGOTOMY AND TUBES      AUGMENTATION OF BREAST  07/2018     Social History     Tobacco Use    Smoking status: Every Day     Current packs/day: 1.00     Average packs/day: 1 pack/day for 10.0 years (10.0 ttl pk-yrs)     Types: Cigarettes    Smokeless tobacco: Never   Substance Use Topics    Alcohol use: Yes     Alcohol/week: 4.0 standard drinks of alcohol     Types: 4 Shots of liquor per week    Drug use:  Yes     Frequency: 7.0 times per week     Types: Marijuana        Review of Systems   As noted in HPI in addition     Constitutional: Negative for chills, fatigue and fever.   Eyes: No double vision, No blurred vision  Chronic nasal congestion is noted and chronic sinusitis noted has a deviated nasal septum  Neuro: No headaches, No dizziness  Respiratory: Negative for cough, shortness of breath and wheezing.    Cardiovascular: Negative for chest pain. Negative for palpitations and leg swelling.   Gastrointestinal: Negative for abdominal pain, No melena, diarrhea, nausea and vomiting.   Genitourinary: Negative for dysuria and frequency, Negative for hematuria  Skin: Negative for bruising, Negative for edema or discoloration noted.   Endocrine: Negative for polyphagia, Negative for heat intolerance, Negative for cold intolerance  Psychiatric: Negative for depression, Negative for anxiety, Negative for memory loss  Musculoskeletal: Negative for neck pain, Negative for muscle weakness, Negative for back pain          Objective        Vitals:    06/10/24 1525   BP: 108/62   Pulse: 72   Resp: 16       LIPIDS - LAST 2   Lab Results   Component Value Date    CHOL 144 08/18/2022    CHOL 146 12/29/2010    HDL 44 08/18/2022    HDL 55 12/29/2010    LDLCALC 87 08/18/2022    LDLCALC 82.0 12/29/2010    TRIG 64 08/18/2022    TRIG 45 12/29/2010    CHOLHDL 37.7 12/29/2010       CBC - LAST 2  Lab Results   Component Value Date    WBC 5.59 01/24/2023    WBC 5.41 07/28/2020    RBC 4.30 01/24/2023    RBC 4.21 07/28/2020    HGB 13.9 01/24/2023    HGB 13.1 07/28/2020    HCT 41.0 01/24/2023    HCT 40.8 07/28/2020    MCV 95 01/24/2023    MCV 97 07/28/2020    MCH 32.3 (H) 01/24/2023    MCH 31.1 (H) 07/28/2020    MCHC 33.9 01/24/2023    MCHC 32.1 07/28/2020    RDW 12.3 01/24/2023    RDW 12.1 07/28/2020     01/24/2023     07/28/2020    MPV 9.4 01/24/2023    MPV 9.8 07/28/2020    GRAN 3.4 01/24/2023    GRAN 60.3 01/24/2023    LYMPH  "1.8 01/24/2023    LYMPH 32.6 01/24/2023    MONO 0.3 01/24/2023    MONO 6.1 01/24/2023    BASO 0.02 01/24/2023    BASO 0.01 07/28/2020    NRBC 0 01/24/2023    NRBC 0 07/28/2020       CHEMISTRY & LIVER FUNCTION - LAST 2  Lab Results   Component Value Date     01/24/2023     07/28/2020    K 3.6 01/24/2023    K 4.1 07/28/2020     01/24/2023     07/28/2020    CO2 22 (L) 01/24/2023    CO2 23 07/28/2020    ANIONGAP 11 01/24/2023    ANIONGAP 11 07/28/2020    BUN 10 01/24/2023    BUN 6 07/28/2020    CREATININE 0.6 01/24/2023    CREATININE 0.7 07/28/2020    GLU 83 01/24/2023    GLU 87 07/28/2020    CALCIUM 9.5 01/24/2023    CALCIUM 9.3 07/28/2020    MG 2.0 01/24/2023    ALBUMIN 4.7 01/24/2023    ALBUMIN 4.2 07/28/2020    PROT 8.1 01/24/2023    PROT 7.3 07/28/2020    ALKPHOS 53 (L) 01/24/2023    ALKPHOS 71 07/28/2020    ALT 19 01/24/2023    ALT 13 07/28/2020    AST 20 01/24/2023    AST 16 07/28/2020    BILITOT 0.9 01/24/2023    BILITOT 0.3 07/28/2020        CARDIAC PROFILE - LAST 2  No results found for: "BNP", "CPK", "CPKMB", "LDH", "TROPONINI", "TROPONINIHS"     COAGULATION - LAST 2  No results found for: "LABPT", "INR", "APTT"    ENDOCRINE & PSA - LAST 2  Lab Results   Component Value Date    TSH 1.070 08/18/2022    TSH 1.33 12/29/2010        ECHOCARDIOGRAM RESULTS  No results found for this or any previous visit.      CURRENT/PREVIOUS VISIT EKG  No results found for this or any previous visit.  No valid procedures specified.   No results found for this or any previous visit.    No valid procedures specified.          PREVIOUS STRESS TEST              PREVIOUS ANGIOGRAM        PHYSICAL EXAM    GENERAL: well built, well nourished, well-developed in no apparent distress alert and oriented.   HEENT: Normocephalic. Pupils normal and conjunctivae normal.  Mucous membranes normal, no cyanosis or icterus, trachea central,no pallor or icterus is noted..   NECK: No JVD. No bruit..   THYROID: Thyroid not " enlarged. No nodules present..   CARDIAC: Regular rate and rhythm. S1 is normal.S2 is normal.No gallops, clicks or murmurs noted at this time.  CHEST ANATOMY: normal.   LUNGS: Clear to auscultation. No wheezing or rhonchi..   ABDOMEN: Soft no masses or organomegaly.  No abdomen pulsations or bruits.  Normal bowel sounds. No pulsations and no masses felt, No guarding or rebound.   EXTREMITIES: No cyanosis, clubbing or edema noted at this time., no calf tenderness bilaterally.   PERIPHERAL VASCULAR SYSTEM: Good palpable distal pulses.   CENTRAL NERVOUS SYSTEM: No focal motor or sensory deficits noted.   SKIN: Skin without lesions, moist, well perfused.   MUSCLE STRENGTH & TONE: No noteable weakness, atrophy or abnormal movement.     I HAVE REVIEWED :    The vital signs, nurses notes, and all the pertinent radiology and labs.  06/10/2024:   EKG shows normal sinus rhythm within normal limits    Current Outpatient Medications   Medication Instructions    albuterol (VENTOLIN HFA) 90 mcg/actuation inhaler 2 puffs, Inhalation, Every 6 hours PRN, Rescue    cetirizine (ZYRTEC) 10 mg, Oral, Daily    ergocalciferol, vitamin D2, (VITAMIN D2 ORAL) 20,000 Units, Oral    fluticasone propionate (FLONASE) 50 mcg/actuation nasal spray 1 spray, Each Nostril, Daily    montelukast (SINGULAIR) 10 mg, Oral, Nightly    RETIN-A MICRO PUMP 0.06 % GlwP No dose, route, or frequency recorded.          Assessment & Plan     1. Palpitations  Assessment & Plan:  Multiple episodes of palpitations and bradycardia noted.  Will obtain a event recorder to assess the extent of bradycardia rhythm if any and also to determine tachycardia.  Recommend a symptom limited exercise stress test to assess for tachycardia is exercise-induced and if negative then encouraged and a daily exercise program   Recommend to obtain echocardiogram for chamber size measurements valvular morphology as well.      2. Dyspnea, unspecified type  Assessment & Plan:  Dyspnea related  to her chronic smoking and bronchitis.  Recommend to continue on inhaler therapy as well.          3. Recurrent productive cough  Assessment & Plan:  She is presently on Singulair and Ventolin inhaler.  Recommend to obtain a chest x-ray to rule out any primary pulmonary pathology.    Recommend to start on doxycycline at 100 mg p.o. b.i.d. for 1 week duration.  Out recommend to avoid the sun while using the antibiotic pill a   Continue on Ventolin inhaler  Singulair at 10 mg daily as well      4. Gastroesophageal reflux disease without esophagitis  Assessment & Plan:  She was symptoms of significant gastritis esophageal reflux symptoms.  I would encourage her to start on pantoprazole at 40 mg twice a day initially for next 3 4 weeks and then daily thereafter.      5. Tobacco use disorder, severe, dependence  Assessment & Plan:  Dangers of cigarette smoking were reviewed with patient in detail. Patient was Counseled for 10 minutes or greater. Nicotine replacement options were discussed. Nicotine replacement was discussed- prescribed  Recommend to start on Chantix starter pack and then bump it up to 1 mg twice daily thereafter.  In addition to other medications discussed about             No follow-ups on file.

## 2024-06-10 NOTE — ASSESSMENT & PLAN NOTE
Dyspnea related to her chronic smoking and bronchitis.  Recommend to continue on inhaler therapy as well.

## 2024-06-13 ENCOUNTER — LAB VISIT (OUTPATIENT)
Dept: LAB | Facility: HOSPITAL | Age: 34
End: 2024-06-13
Attending: INTERNAL MEDICINE
Payer: COMMERCIAL

## 2024-06-13 DIAGNOSIS — R00.2 PALPITATIONS: ICD-10-CM

## 2024-06-13 DIAGNOSIS — F17.200 TOBACCO USE DISORDER, SEVERE, DEPENDENCE: ICD-10-CM

## 2024-06-13 DIAGNOSIS — K21.9 GASTROESOPHAGEAL REFLUX DISEASE WITHOUT ESOPHAGITIS: ICD-10-CM

## 2024-06-13 DIAGNOSIS — R05.8 RECURRENT PRODUCTIVE COUGH: ICD-10-CM

## 2024-06-13 LAB
ALBUMIN SERPL BCP-MCNC: 4 G/DL (ref 3.5–5.2)
ALP SERPL-CCNC: 67 U/L (ref 55–135)
ALT SERPL W/O P-5'-P-CCNC: 11 U/L (ref 10–44)
ANION GAP SERPL CALC-SCNC: 9 MMOL/L (ref 8–16)
AST SERPL-CCNC: 17 U/L (ref 10–40)
BILIRUB SERPL-MCNC: 0.5 MG/DL (ref 0.1–1)
BUN SERPL-MCNC: 7 MG/DL (ref 6–20)
CALCIUM SERPL-MCNC: 9.3 MG/DL (ref 8.7–10.5)
CHLORIDE SERPL-SCNC: 109 MMOL/L (ref 95–110)
CHOLEST SERPL-MCNC: 129 MG/DL (ref 120–199)
CHOLEST/HDLC SERPL: 3.1 {RATIO} (ref 2–5)
CO2 SERPL-SCNC: 23 MMOL/L (ref 23–29)
CREAT SERPL-MCNC: 0.7 MG/DL (ref 0.5–1.4)
ERYTHROCYTE [DISTWIDTH] IN BLOOD BY AUTOMATED COUNT: 12.9 % (ref 11.5–14.5)
EST. GFR  (NO RACE VARIABLE): >60 ML/MIN/1.73 M^2
GLUCOSE SERPL-MCNC: 73 MG/DL (ref 70–110)
HCT VFR BLD AUTO: 39.9 % (ref 37–48.5)
HDLC SERPL-MCNC: 42 MG/DL (ref 40–75)
HDLC SERPL: 32.6 % (ref 20–50)
HGB BLD-MCNC: 13.5 G/DL (ref 12–16)
LDLC SERPL CALC-MCNC: 64.4 MG/DL (ref 63–159)
MAGNESIUM SERPL-MCNC: 2 MG/DL (ref 1.6–2.6)
MCH RBC QN AUTO: 33 PG (ref 27–31)
MCHC RBC AUTO-ENTMCNC: 33.8 G/DL (ref 32–36)
MCV RBC AUTO: 98 FL (ref 82–98)
NONHDLC SERPL-MCNC: 87 MG/DL
PLATELET # BLD AUTO: 201 K/UL (ref 150–450)
PMV BLD AUTO: 9.9 FL (ref 9.2–12.9)
POTASSIUM SERPL-SCNC: 4 MMOL/L (ref 3.5–5.1)
PROT SERPL-MCNC: 6.7 G/DL (ref 6–8.4)
RBC # BLD AUTO: 4.09 M/UL (ref 4–5.4)
SODIUM SERPL-SCNC: 141 MMOL/L (ref 136–145)
TRIGL SERPL-MCNC: 113 MG/DL (ref 30–150)
TSH SERPL DL<=0.005 MIU/L-ACNC: 0.99 UIU/ML (ref 0.4–4)
WBC # BLD AUTO: 4.66 K/UL (ref 3.9–12.7)

## 2024-06-13 PROCEDURE — 84443 ASSAY THYROID STIM HORMONE: CPT | Performed by: INTERNAL MEDICINE

## 2024-06-13 PROCEDURE — 80061 LIPID PANEL: CPT | Performed by: INTERNAL MEDICINE

## 2024-06-13 PROCEDURE — 80053 COMPREHEN METABOLIC PANEL: CPT | Performed by: INTERNAL MEDICINE

## 2024-06-13 PROCEDURE — 85027 COMPLETE CBC AUTOMATED: CPT | Performed by: INTERNAL MEDICINE

## 2024-06-13 PROCEDURE — 36415 COLL VENOUS BLD VENIPUNCTURE: CPT | Mod: PO | Performed by: INTERNAL MEDICINE

## 2024-06-13 PROCEDURE — 83735 ASSAY OF MAGNESIUM: CPT | Performed by: INTERNAL MEDICINE

## 2024-06-17 ENCOUNTER — HOSPITAL ENCOUNTER (OUTPATIENT)
Dept: CARDIOLOGY | Facility: CLINIC | Age: 34
Discharge: HOME | End: 2024-06-17
Attending: INTERNAL MEDICINE
Payer: COMMERCIAL

## 2024-06-17 DIAGNOSIS — K21.9 GASTROESOPHAGEAL REFLUX DISEASE WITHOUT ESOPHAGITIS: ICD-10-CM

## 2024-06-17 DIAGNOSIS — R00.2 PALPITATIONS: ICD-10-CM

## 2024-06-17 DIAGNOSIS — R05.8 RECURRENT PRODUCTIVE COUGH: ICD-10-CM

## 2024-06-17 DIAGNOSIS — F17.200 TOBACCO USE DISORDER, SEVERE, DEPENDENCE: ICD-10-CM

## 2024-06-26 ENCOUNTER — HOSPITAL ENCOUNTER (OUTPATIENT)
Dept: CARDIOLOGY | Facility: HOSPITAL | Age: 34
Discharge: HOME | End: 2024-06-26
Attending: INTERNAL MEDICINE
Payer: COMMERCIAL

## 2024-06-26 VITALS — BODY MASS INDEX: 20.75 KG/M2 | HEIGHT: 68 IN | WEIGHT: 136.88 LBS

## 2024-06-26 DIAGNOSIS — K21.9 GASTROESOPHAGEAL REFLUX DISEASE WITHOUT ESOPHAGITIS: ICD-10-CM

## 2024-06-26 DIAGNOSIS — F17.200 TOBACCO USE DISORDER, SEVERE, DEPENDENCE: ICD-10-CM

## 2024-06-26 DIAGNOSIS — R00.2 PALPITATIONS: ICD-10-CM

## 2024-06-26 DIAGNOSIS — R05.8 RECURRENT PRODUCTIVE COUGH: ICD-10-CM

## 2024-06-26 PROCEDURE — 93306 TTE W/DOPPLER COMPLETE: CPT | Mod: 26,,, | Performed by: INTERNAL MEDICINE

## 2024-06-26 PROCEDURE — 93306 TTE W/DOPPLER COMPLETE: CPT

## 2024-06-27 LAB
AORTIC ROOT ANNULUS: 2.48 CM
AORTIC VALVE CUSP SEPERATION: 2.06 CM
AV INDEX (PROSTH): 0.85
AV MEAN GRADIENT: 6 MMHG
AV PEAK GRADIENT: 10 MMHG
AV VALVE AREA BY VELOCITY RATIO: 2.24 CM²
AV VALVE AREA: 2.51 CM²
AV VELOCITY RATIO: 0.76
BSA FOR ECHO PROCEDURE: 1.73 M2
CV ECHO LV RWT: 0.31 CM
DOP CALC AO PEAK VEL: 1.57 M/S
DOP CALC AO VTI: 31.6 CM
DOP CALC LVOT AREA: 3 CM2
DOP CALC LVOT DIAMETER: 1.94 CM
DOP CALC LVOT PEAK VEL: 1.19 M/S
DOP CALC LVOT STROKE VOLUME: 79.18 CM3
DOP CALC MV VTI: 24.9 CM
DOP CALCLVOT PEAK VEL VTI: 26.8 CM
E WAVE DECELERATION TIME: 208 MSEC
E/A RATIO: 2.11
E/E' RATIO: 6.55 M/S
ECHO LV POSTERIOR WALL: 0.76 CM (ref 0.6–1.1)
FRACTIONAL SHORTENING: 37 % (ref 28–44)
INTERVENTRICULAR SEPTUM: 0.77 CM (ref 0.6–1.1)
IVRT: 68.51 MSEC
LA MAJOR: 5.42 CM
LA MINOR: 4.75 CM
LEFT ATRIUM SIZE: 3.3 CM
LEFT INTERNAL DIMENSION IN SYSTOLE: 3.12 CM (ref 2.1–4)
LEFT VENTRICLE DIASTOLIC VOLUME INDEX: 66 ML/M2
LEFT VENTRICLE DIASTOLIC VOLUME: 114.84 ML
LEFT VENTRICLE MASS INDEX: 72 G/M2
LEFT VENTRICLE SYSTOLIC VOLUME INDEX: 22.1 ML/M2
LEFT VENTRICLE SYSTOLIC VOLUME: 38.41 ML
LEFT VENTRICULAR INTERNAL DIMENSION IN DIASTOLE: 4.94 CM (ref 3.5–6)
LEFT VENTRICULAR MASS: 125.64 G
LV LATERAL E/E' RATIO: 5.59 M/S
LV SEPTAL E/E' RATIO: 7.92 M/S
LVED V (TEICH): 114.84 ML
LVES V (TEICH): 38.41 ML
LVOT MG: 3.41 MMHG
LVOT MV: 0.89 CM/S
MV A" WAVE DURATION": 98.48 MSEC
MV MEAN GRADIENT: 2 MMHG
MV PEAK A VEL: 0.45 M/S
MV PEAK E VEL: 0.95 M/S
MV PEAK GRADIENT: 4 MMHG
MV STENOSIS PRESSURE HALF TIME: 76.35 MS
MV VALVE AREA BY CONTINUITY EQUATION: 3.18 CM2
MV VALVE AREA P 1/2 METHOD: 2.88 CM2
OHS CV RV/LV RATIO: 0.46 CM
PV MV: 0.73 M/S
PV PEAK GRADIENT: 4 MMHG
PV PEAK VELOCITY: 0.95 M/S
RA MAJOR: 4.61 CM
RA PRESSURE ESTIMATED: 3 MMHG
RIGHT VENTRICULAR END-DIASTOLIC DIMENSION: 2.27 CM
RV TISSUE DOPPLER FREE WALL SYSTOLIC VELOCITY 1 (APICAL 4 CHAMBER VIEW): 11.39 CM/S
TDI LATERAL: 0.17 M/S
TDI SEPTAL: 0.12 M/S
TDI: 0.15 M/S
TRICUSPID ANNULAR PLANE SYSTOLIC EXCURSION: 2.1 CM
Z-SCORE OF LEFT VENTRICULAR DIMENSION IN END DIASTOLE: 0.25
Z-SCORE OF LEFT VENTRICULAR DIMENSION IN END SYSTOLE: 0.36

## 2024-07-02 ENCOUNTER — TELEPHONE (OUTPATIENT)
Dept: CARDIOLOGY | Facility: CLINIC | Age: 34
End: 2024-07-02
Payer: COMMERCIAL

## 2024-07-02 NOTE — TELEPHONE ENCOUNTER
Called the patient on 7/2/24  about test results per. Anthony Briones NP   Patient understood the stated

## 2024-07-02 NOTE — TELEPHONE ENCOUNTER
----- Message from Elda Briones NP sent at 6/27/2024  2:15 PM CDT -----  No concerning or significant abnormalities noted on echocardiogram.

## 2025-03-24 ENCOUNTER — OFFICE VISIT (OUTPATIENT)
Dept: FAMILY MEDICINE | Facility: CLINIC | Age: 35
End: 2025-03-24
Payer: COMMERCIAL

## 2025-03-24 VITALS
WEIGHT: 141 LBS | SYSTOLIC BLOOD PRESSURE: 102 MMHG | HEIGHT: 68 IN | DIASTOLIC BLOOD PRESSURE: 64 MMHG | BODY MASS INDEX: 21.37 KG/M2 | TEMPERATURE: 98 F

## 2025-03-24 DIAGNOSIS — R14.2 BELCHING: ICD-10-CM

## 2025-03-24 DIAGNOSIS — Z12.11 SCREENING FOR COLON CANCER: ICD-10-CM

## 2025-03-24 DIAGNOSIS — F17.200 TOBACCO USE DISORDER, SEVERE, DEPENDENCE: ICD-10-CM

## 2025-03-24 DIAGNOSIS — R13.10 DYSPHAGIA, UNSPECIFIED TYPE: ICD-10-CM

## 2025-03-24 DIAGNOSIS — E55.9 VITAMIN D DEFICIENCY: ICD-10-CM

## 2025-03-24 DIAGNOSIS — Z98.82 BREAST IMPLANT IN SITU: ICD-10-CM

## 2025-03-24 DIAGNOSIS — K21.9 GASTROESOPHAGEAL REFLUX DISEASE, UNSPECIFIED WHETHER ESOPHAGITIS PRESENT: ICD-10-CM

## 2025-03-24 DIAGNOSIS — Z72.89 OTHER PROBLEMS RELATED TO LIFESTYLE: ICD-10-CM

## 2025-03-24 DIAGNOSIS — R19.7 DIARRHEA, UNSPECIFIED TYPE: ICD-10-CM

## 2025-03-24 DIAGNOSIS — R05.8 RECURRENT PRODUCTIVE COUGH: ICD-10-CM

## 2025-03-24 DIAGNOSIS — R00.2 PALPITATIONS: Primary | ICD-10-CM

## 2025-03-24 PROCEDURE — 99999 PR PBB SHADOW E&M-EST. PATIENT-LVL IV: CPT | Mod: PBBFAC,,, | Performed by: FAMILY MEDICINE

## 2025-03-24 RX ORDER — ALBUTEROL SULFATE 90 UG/1
2 INHALANT RESPIRATORY (INHALATION) EVERY 6 HOURS PRN
Qty: 18 G | Refills: 5 | Status: SHIPPED | OUTPATIENT
Start: 2025-03-24 | End: 2026-03-24

## 2025-03-24 RX ORDER — PANTOPRAZOLE SODIUM 40 MG/1
40 TABLET, DELAYED RELEASE ORAL 2 TIMES DAILY
Qty: 180 TABLET | Refills: 1 | Status: SHIPPED | OUTPATIENT
Start: 2025-03-24

## 2025-03-24 NOTE — PROGRESS NOTES
Subjective:       Patient ID: Liliana Oshea is a 34 y.o. female.    Chief Complaint: Establish Care and Cough (Hard swallowing and breathing)    New patient visit.      Social history.  Smoker pack per day.  And uses marijuana.  Alcohol champagne on weekends.  Works from home.      Family history mother hypothyroidism.  Father fluid in the lungs.  Maternal grandmother:  Colon issue.  Maternal grandmother breast cancer.  Stroke also.  Maternal grandfather prostate cancer.      Immunizations TD AP appears to be due.    Past medical history.  Breast implants 2018.  Smoker.  PE tubes and adenoidectomy before.   2 para 1 A/B 0 add an ectopic pregnancy.  History of some palpitations.  Have gone on for years.  Echo and Holter negative.      Review of Systems   Constitutional: Negative.    HENT: Negative.     Eyes: Negative.    Respiratory:  Positive for cough and shortness of breath.    Cardiovascular: Negative.    Gastrointestinal:  Positive for diarrhea.   Endocrine: Negative.    Genitourinary: Negative.    Musculoskeletal: Negative.    Skin: Negative.    Allergic/Immunologic: Negative.    Neurological: Negative.    Hematological: Negative.    Psychiatric/Behavioral: Negative.     All other systems reviewed and are negative.      Objective:      Physical Exam  Vitals and nursing note reviewed.   Constitutional:       Appearance: Normal appearance. She is well-developed and normal weight.   HENT:      Head: Normocephalic and atraumatic.      Right Ear: Tympanic membrane normal.      Left Ear: Tympanic membrane normal.      Nose: Nose normal.      Mouth/Throat:      Mouth: Mucous membranes are moist.   Eyes:      Conjunctiva/sclera: Conjunctivae normal.      Pupils: Pupils are equal, round, and reactive to light.   Neck:      Vascular: No carotid bruit.   Cardiovascular:      Rate and Rhythm: Normal rate and regular rhythm.      Pulses: Normal pulses.      Heart sounds: Normal heart sounds. No murmur  heard.     No gallop.   Pulmonary:      Effort: Pulmonary effort is normal.      Breath sounds: Normal breath sounds.   Abdominal:      General: Bowel sounds are normal.      Palpations: Abdomen is soft.      Tenderness: There is no abdominal tenderness.   Musculoskeletal:         General: Normal range of motion.      Cervical back: Normal range of motion.      Right lower leg: No edema.      Left lower leg: No edema.   Lymphadenopathy:      Cervical: No cervical adenopathy.   Skin:     General: Skin is warm and dry.   Neurological:      General: No focal deficit present.      Mental Status: She is alert and oriented to person, place, and time.   Psychiatric:         Behavior: Behavior normal.         Thought Content: Thought content normal.         Judgment: Judgment normal.         Assessment:       1. Palpitations    2. Recurrent productive cough    3. Belching    4. Gastroesophageal reflux disease, unspecified whether esophagitis present    5. Dysphagia, unspecified type    6. Vitamin D deficiency    7. Diarrhea, unspecified type    8. Tobacco use disorder, severe, dependence    9. Breast implant in situ    10. Screening for colon cancer    11. Other problems related to lifestyle        Plan:       Palpitations  -     C-Reactive Protein; Future; Expected date: 03/24/2025  -     Comprehensive Metabolic Panel; Future; Expected date: 03/24/2025  -     Lipid Panel; Future; Expected date: 03/24/2025    Recurrent productive cough  -     Complete PFT with bronchodilator; Future    Belching    Gastroesophageal reflux disease, unspecified whether esophagitis present  -     CBC Auto Differential; Future; Expected date: 03/24/2025    Dysphagia, unspecified type  -     Ambulatory referral/consult to Gastroenterology; Future; Expected date: 03/31/2025    Vitamin D deficiency    Diarrhea, unspecified type  -     TSH; Future; Expected date: 03/24/2025  -     Celiac Disease Panel; Future; Expected date: 03/24/2025  -      Sedimentation Rate; Future; Expected date: 03/24/2025    Tobacco use disorder, severe, dependence    Breast implant in situ    Screening for colon cancer  -     Ambulatory referral/consult to Gastroenterology; Future; Expected date: 03/31/2025    Other problems related to lifestyle  -     Hepatitis C Antibody; Future; Expected date: 03/24/2025    Other orders  -     albuterol (VENTOLIN HFA) 90 mcg/actuation inhaler; Inhale 2 puffs into the lungs every 6 (six) hours as needed for Wheezing. Rescue  Dispense: 18 g; Refill: 5  -     pantoprazole (PROTONIX) 40 MG tablet; Take 1 tablet (40 mg total) by mouth 2 (two) times daily.  Dispense: 180 tablet; Refill: 1    CBC CMP lipids TSH ordered.    In addition to routine physical.  Having problem with excessive belching coughing for about 4 years now.  Gastroesophageal reflux disease saw Nba Jerez.  Add evidence of acid reflux irritating the vocal cords.  She does have dysphagia also.  Has had frequent diarrhea.  Vitamin-D deficiency.    Physical examination.  Pharynx without erythema neck is supple without adenopathy.  Chest clear.  Heart regular rate rhythm.  Abdomen bowel sounds positive soft nontender no guarding or rebound.  Extremities without edema positive pulses.    Impression.  Diarrhea.  Dysphagia.  Gastroesophageal reflux disease.  Cough.      Plan symptoms male be due to acid reflux.  Will check pulmonary function studies.  She has had chest x-ray not long ago.  Will check sprue panel sed rate CRP hepatitis-C antibody start Protonix 40 mg b.i.d..  To gastroenterology for upper endoscopy and possible colonoscopy refer her.  One-month follow-up.

## 2025-07-03 RX ORDER — PANTOPRAZOLE SODIUM 40 MG/1
40 TABLET, DELAYED RELEASE ORAL 2 TIMES DAILY
Qty: 180 TABLET | Refills: 1 | Status: SHIPPED | OUTPATIENT
Start: 2025-07-03

## 2025-07-03 NOTE — TELEPHONE ENCOUNTER
No care due was identified.  [facility] Embedded Care Due Messages. Reference number: [id number].   7/03/2025 11:06:33 AM CDT

## 2025-07-04 NOTE — TELEPHONE ENCOUNTER
Refill Routing Note   Medication(s) are not appropriate for processing by [facility] for the following reason(s):        Outside of protocol    [facility] action(s):  Route        Medication Therapy Plan: Outside of protocol: Protonix total daily dose greater than 40 mg daily      Appointments  past 12m or future 3m with PCP    Date Provider   Last Visit   3/24/2025 Alvaro Lackey III, MD   Next Visit   Visit date not found Alvaro Lackey III, MD   ED visits in past 90 days: 0        Note composed:8:22 PM 07/03/2025